# Patient Record
Sex: MALE | Race: WHITE | NOT HISPANIC OR LATINO | Employment: OTHER | URBAN - METROPOLITAN AREA
[De-identification: names, ages, dates, MRNs, and addresses within clinical notes are randomized per-mention and may not be internally consistent; named-entity substitution may affect disease eponyms.]

---

## 2024-02-13 ENCOUNTER — OFFICE VISIT (OUTPATIENT)
Dept: VASCULAR SURGERY | Facility: CLINIC | Age: 69
End: 2024-02-13
Payer: MEDICARE

## 2024-02-13 VITALS
HEIGHT: 70 IN | OXYGEN SATURATION: 95 % | BODY MASS INDEX: 28.35 KG/M2 | HEART RATE: 73 BPM | WEIGHT: 198 LBS | DIASTOLIC BLOOD PRESSURE: 74 MMHG | SYSTOLIC BLOOD PRESSURE: 118 MMHG

## 2024-02-13 DIAGNOSIS — M25.512 SHOULDER PAIN, LEFT: Primary | ICD-10-CM

## 2024-02-13 PROCEDURE — 1159F MED LIST DOCD IN RCRD: CPT | Performed by: SURGERY

## 2024-02-13 PROCEDURE — 99203 OFFICE O/P NEW LOW 30 MIN: CPT | Performed by: SURGERY

## 2024-02-13 PROCEDURE — 1160F RVW MEDS BY RX/DR IN RCRD: CPT | Performed by: SURGERY

## 2024-02-13 RX ORDER — AMOXICILLIN 500 MG
1000 CAPSULE ORAL
COMMUNITY
Start: 2016-06-07

## 2024-02-13 RX ORDER — OLMESARTAN MEDOXOMIL 20 MG/1
TABLET ORAL
COMMUNITY
Start: 2019-06-05

## 2024-02-13 RX ORDER — ROSUVASTATIN CALCIUM 10 MG/1
TABLET, COATED ORAL
COMMUNITY

## 2024-02-19 PROBLEM — M25.512 SHOULDER PAIN, LEFT: Status: ACTIVE | Noted: 2024-02-19

## 2024-02-20 NOTE — PROGRESS NOTES
"Benjamin Hill is a 68 y.o. male     Subjective   This patient presents today as a new consult for evaluation of left arm pain.  He states that this pain has been going on now for about a month.  The pain is from his shoulder to his elbow.  He has never smoked.  He has no significant medical history.  He did have neck surgery.  He has no known drug allergies.  He is 5 foot 10 and weighs 198 pounds.  He is currently not a diabetic.         Objective     Vitals:    02/13/24 1300   BP: 118/74   Pulse: 73   SpO2: 95%      Physical Exam  This patient is alert and oriented x3.  No acute distress.  Head is normocephalic.  Pupils are equal and reactive to light and accommodation.  Neck is soft and supple without palpable lymph nodes.  Heart is regular rate.  Lungs are clear.  Abdomen is soft with positive bowel sounds there is no pain on palpation.  Upper and lower extremities have adequate range of motion with palpable brachial radial femoral and popliteal pulses.  Skin turgor is adequate.  Psychologically the patient appears to be acting appropriately.  Blood pressure 118/74, pulse 73, height 1.778 m (5' 10\"), weight 89.8 kg (198 lb), SpO2 95 %.            There are no problems to display for this patient.         Current Outpatient Medications:     olmesartan (BENIcar) 20 mg tablet, , Disp: , Rfl:     omega-3 fatty acids-fish oil 300-1,000 mg capsule, Take 1 capsule (1,000 mg) by mouth once daily., Disp: , Rfl:     rosuvastatin (Crestor) 10 mg tablet, , Disp: , Rfl:      No results found for: \"WBC\", \"HGB\", \"HCT\", \"PLT\", \"CHOL\", \"TRIG\", \"HDL\", \"LDLDIRECT\", \"ALT\", \"AST\", \"NA\", \"K\", \"CL\", \"CREATININE\", \"BUN\", \"CO2\", \"TSH\", \"PSA\", \"INR\", \"GLUF\", \"HGBA1C\", \"ALBUR\"    Patient was never admitted.              Assessment/Plan   Active Problems:  There are no active Hospital Problems.      This patient presents today as a new consult for evaluation of left upper arm pain.  He states the pain does go from his shoulder to his " elbow.  He does have a history of neck surgery.  On physical exam, he does have easily palpable brachial radial and ulnar pulses.  His left hand also appears well-perfused.  It is difficult for him to exactly describe the pain.  I am concerned about his previous neck surgery and his left upper arm pain.  At this time, I would like to refer him to Dr. Missy Mata who is a nonsurgical orthopedic physician.  His left upper arm pain may very well be referred pain from his C-spine or possibly shoulder arthritis.  Patient is in full agreement with this plan.  Thank you very much for allow me to take part in the care of your patient.  Sincerely years, Dr. Armin Jackson, DO

## 2025-05-28 ENCOUNTER — OFFICE VISIT (OUTPATIENT)
Dept: URGENT CARE | Age: 70
End: 2025-05-28
Payer: MEDICARE

## 2025-05-28 ENCOUNTER — ANCILLARY PROCEDURE (OUTPATIENT)
Dept: URGENT CARE | Age: 70
End: 2025-05-28
Payer: MEDICARE

## 2025-05-28 VITALS
OXYGEN SATURATION: 98 % | SYSTOLIC BLOOD PRESSURE: 129 MMHG | DIASTOLIC BLOOD PRESSURE: 75 MMHG | HEIGHT: 70 IN | RESPIRATION RATE: 18 BRPM | TEMPERATURE: 98 F | HEART RATE: 68 BPM | BODY MASS INDEX: 27.2 KG/M2 | WEIGHT: 190 LBS

## 2025-05-28 DIAGNOSIS — R10.32 LEFT LOWER QUADRANT ABDOMINAL PAIN: Primary | ICD-10-CM

## 2025-05-28 DIAGNOSIS — R10.32 LEFT LOWER QUADRANT ABDOMINAL PAIN: ICD-10-CM

## 2025-05-28 LAB
POC APPEARANCE, URINE: CLEAR
POC BILIRUBIN, URINE: NEGATIVE
POC BLOOD, URINE: ABNORMAL
POC COLOR, URINE: YELLOW
POC GLUCOSE, URINE: NEGATIVE MG/DL
POC KETONES, URINE: ABNORMAL MG/DL
POC LEUKOCYTES, URINE: NEGATIVE
POC NITRITE,URINE: NEGATIVE
POC PH, URINE: 6 PH
POC PROTEIN, URINE: ABNORMAL MG/DL
POC SPECIFIC GRAVITY, URINE: 1.02
POC UROBILINOGEN, URINE: 0.2 EU/DL

## 2025-05-28 PROCEDURE — 74022 RADEX COMPL AQT ABD SERIES: CPT | Performed by: FAMILY MEDICINE

## 2025-05-28 RX ORDER — DICYCLOMINE HYDROCHLORIDE 20 MG/1
20 TABLET ORAL 3 TIMES DAILY
Qty: 30 TABLET | Refills: 0 | Status: SHIPPED | OUTPATIENT
Start: 2025-05-28 | End: 2025-06-02

## 2025-05-28 RX ORDER — APIXABAN 5 MG/1
TABLET, FILM COATED ORAL
COMMUNITY
Start: 2024-09-23

## 2025-05-28 RX ORDER — METOPROLOL SUCCINATE 50 MG/1
TABLET, EXTENDED RELEASE ORAL
COMMUNITY
Start: 2025-04-04

## 2025-05-28 RX ORDER — AMIODARONE HYDROCHLORIDE 200 MG/1
100 TABLET ORAL
COMMUNITY
Start: 2024-10-03

## 2025-05-28 RX ORDER — AMOXICILLIN AND CLAVULANATE POTASSIUM 875; 125 MG/1; MG/1
875 TABLET, FILM COATED ORAL 2 TIMES DAILY
Qty: 20 TABLET | Refills: 0 | Status: SHIPPED | OUTPATIENT
Start: 2025-05-28 | End: 2025-06-04

## 2025-05-28 NOTE — PATIENT INSTRUCTIONS
You had x-rays taken. Your x-ray reading is an initial interpretation. It will be further reviewed by a radiologist. If further treatment is necessary, you will be notified by the urgent care.    Hydrate well with plenty of fluids.    You will be started on antibiotic which will be sent to the pharmacy; please complete the regimen as directed even if your symptoms improve. It is recommended to take an Probiotic while on this medication to reduce symptoms of upset stomach, diarrhea.    Use the prescribed medication for your abdomen as directed, as needed for pain and cramping.    Follow-up with your PCP if symptoms are not improving in the next 3-5 days, or sooner if they worsen.

## 2025-05-28 NOTE — PROGRESS NOTES
"Subjective   Patient ID: Benjamin Hill is a 69 y.o. male. They present today with a chief complaint of Abdominal Pain (L sided stomach pain. ).    Patient disposition: Home    History of Present Illness  HPI  1 day of left-sided abdominal pain.  Pain is localized to the left lower abdomen.  Area is about 3 x 3 cm².  Started yesterday afternoon and since then has been consistent.  Was at the baseball game and had to leave early due to the pain.  Has not wanted to eat much today, has been eating crackers.  Has been drinking well.  No fever or chills.  No nausea or vomiting.  No diarrhea.  No blood in stool.  History of prostate seed implants 7 years ago.  Numbers have been normal since.  Abdominal surgery includes ventral hernia repair several years ago.  No medications taken for the symptoms.  History of A-fib, watch tells him his rate is usually 70-80.  Had 1 episode where bumped up to 110.  Taking a deep breath causes left lower chest/upper abdominal pain.  No history of diverticulitis.  Colonoscopy x 3 was clear.      Past Medical History  Allergies as of 05/28/2025    (No Known Allergies)       Prescriptions Prior to Admission[1]     Current Medications[2]    Problem List[3]    Surgical History[4]     reports that he has never smoked. He has never used smokeless tobacco. He reports that he does not drink alcohol and does not use drugs.    Review of Systems  As noted in HPI. ROS otherwise negative unless noted.       Objective    Vitals:    05/28/25 1728   BP: 129/75   Pulse: 68   Resp: 18   Temp: 36.7 °C (98 °F)   TempSrc: Oral   SpO2: 98%   Weight: 86.2 kg (190 lb)   Height: 1.778 m (5' 10\")     No LMP for male patient.    Physical Exam  Constitutional: vital signs reviewed. Well developed, well nourished. patient alert and patient without distress.   Head and Face: Normal and atraumatic.      Cardiovascular: Heart rate normal, normal S1 and S2, no gallops, no murmurs and no pericardial rub. Rhythm: " Normal.  Pulmonary: No respiratory distress. Palpation of chest: Normal. Clear bilateral breath sounds.   Abdomen: Soft ; no abdominal mass palpated. No organomegaly.  Negative flank tenderness on percussion.  Normal bowel sounds.  Localized left lower quadrant tenderness small area.  Negative rebound.  No overlying skin changes.  Normal movement and rotation of lumbar spine.  Skin: Normal skin color and pigmentation, normal skin turgor, and no rash.        Procedures    Point of Care Test & Imaging Results from this visit           Diagnostic study results (if any) were reviewed.  EKG normal sinus rhythm at 65, not currently in A-fib.  Urinalysis reviewed.  (If applicable) preliminary radiology reading: Abdomen x-ray normal.  Chest x-ray normal.    Assessment/Plan   Allergies, medications, history, and pertinent labs/EKGs/Imaging reviewed.        Medical Decision Making  See note    Orders and Diagnoses  There are no diagnoses linked to this encounter.    Medical Admin Record      Follow Up Instructions  No follow-ups on file.    At time of discharge patient was clinically well-appearing and HDS for outpatient management. The patient and/or family was educated regarding diagnosis, supportive care, OTC and Rx medications. The patient and/or family was given the opportunity to ask questions prior to discharge and all questions answered. They verbalized understanding of my discussion of the plans for treatment, expected course, indications to return to  or seek further evaluation in ED, and the need for timely follow up as directed.      Electronically signed by Alexandr Jones MD           [1] (Not in a hospital admission)  [2]   Current Outpatient Medications   Medication Sig Dispense Refill    amiodarone (Pacerone) 200 mg tablet Take 100 mg by mouth.      Eliquis 5 mg tablet       metoprolol succinate XL (Toprol-XL) 50 mg 24 hr tablet       olmesartan (BENIcar) 20 mg tablet       omega-3 fatty acids-fish oil  300-1,000 mg capsule Take 1 capsule (1,000 mg) by mouth once daily.      rosuvastatin (Crestor) 10 mg tablet        No current facility-administered medications for this visit.   [3]   Patient Active Problem List  Diagnosis    Shoulder pain, left   [4] History reviewed. No pertinent surgical history.

## 2025-05-28 NOTE — PROGRESS NOTES
"Subjective   Patient ID: Benjamin Hill is a 69 y.o. male. They present today with a chief complaint of Abdominal Pain (L sided stomach pain. ).    History of Present Illness  HPI  Patient presents with 1 day of left-sided abdominal pain.  Past Medical History  Allergies as of 05/28/2025    (No Known Allergies)       Prescriptions Prior to Admission[1]     Medical History[2]    Surgical History[3]     reports that he has never smoked. He has never used smokeless tobacco. He reports that he does not drink alcohol and does not use drugs.    Review of Systems  Review of Systems                               Objective    Vitals:    05/28/25 1728   BP: 129/75   Pulse: 68   Resp: 18   Temp: 36.7 °C (98 °F)   TempSrc: Oral   SpO2: 98%   Weight: 86.2 kg (190 lb)   Height: 1.778 m (5' 10\")     No LMP for male patient.    Physical Exam    Procedures    Point of Care Test & Imaging Results from this visit  No results found for this visit on 05/28/25.   Imaging  No results found.    Cardiology, Vascular, and Other Imaging  No other imaging results found for the past 2 days      Diagnostic study results (if any) were reviewed by Alexandr Jones MD.    Assessment/Plan   Allergies, medications, history, and pertinent labs/EKGs/Imaging reviewed by lAexandr Jones MD.     Medical Decision Making  ***    Orders and Diagnoses  There are no diagnoses linked to this encounter.    Medical Admin Record      Patient disposition: { Disposition:[id number]}    Electronically signed by Alexandr Jones MD  5:34 PM           [1] (Not in a hospital admission)  [2] No past medical history on file.  [3] No past surgical history on file.    "

## 2025-08-26 ENCOUNTER — OFFICE VISIT (OUTPATIENT)
Age: 70
End: 2025-08-26
Payer: MEDICARE

## 2025-08-26 ENCOUNTER — HOSPITAL ENCOUNTER (OUTPATIENT)
Dept: ORTHOPEDIC SURGERY | Age: 70
Discharge: HOME | End: 2025-08-28
Payer: MEDICARE

## 2025-08-26 VITALS
OXYGEN SATURATION: 98 % | HEART RATE: 58 BPM | WEIGHT: 196 LBS | BODY MASS INDEX: 28.06 KG/M2 | HEIGHT: 70 IN | SYSTOLIC BLOOD PRESSURE: 110 MMHG | DIASTOLIC BLOOD PRESSURE: 70 MMHG | TEMPERATURE: 97.8 F

## 2025-08-26 DIAGNOSIS — M54.32 SCIATICA OF LEFT SIDE: ICD-10-CM

## 2025-08-26 DIAGNOSIS — M17.12 PRIMARY OSTEOARTHRITIS OF LEFT KNEE: Primary | ICD-10-CM

## 2025-08-26 DIAGNOSIS — M75.41 IMPINGEMENT SYNDROME OF RIGHT SHOULDER: ICD-10-CM

## 2025-08-26 DIAGNOSIS — M19.011 PRIMARY OSTEOARTHRITIS OF RIGHT SHOULDER: ICD-10-CM

## 2025-08-26 DIAGNOSIS — M25.562 ACUTE PAIN OF LEFT KNEE: ICD-10-CM

## 2025-08-26 DIAGNOSIS — R52 PAIN: ICD-10-CM

## 2025-08-26 PROCEDURE — 1159F MED LIST DOCD IN RCRD: CPT | Performed by: STUDENT IN AN ORGANIZED HEALTH CARE EDUCATION/TRAINING PROGRAM

## 2025-08-26 PROCEDURE — 4004F PT TOBACCO SCREEN RCVD TLK: CPT | Performed by: STUDENT IN AN ORGANIZED HEALTH CARE EDUCATION/TRAINING PROGRAM

## 2025-08-26 PROCEDURE — G8419 CALC BMI OUT NRM PARAM NOF/U: HCPCS | Performed by: STUDENT IN AN ORGANIZED HEALTH CARE EDUCATION/TRAINING PROGRAM

## 2025-08-26 PROCEDURE — 1125F AMNT PAIN NOTED PAIN PRSNT: CPT | Performed by: STUDENT IN AN ORGANIZED HEALTH CARE EDUCATION/TRAINING PROGRAM

## 2025-08-26 PROCEDURE — 1123F ACP DISCUSS/DSCN MKR DOCD: CPT | Performed by: STUDENT IN AN ORGANIZED HEALTH CARE EDUCATION/TRAINING PROGRAM

## 2025-08-26 PROCEDURE — 73030 X-RAY EXAM OF SHOULDER: CPT

## 2025-08-26 PROCEDURE — 3017F COLORECTAL CA SCREEN DOC REV: CPT | Performed by: STUDENT IN AN ORGANIZED HEALTH CARE EDUCATION/TRAINING PROGRAM

## 2025-08-26 PROCEDURE — 1160F RVW MEDS BY RX/DR IN RCRD: CPT | Performed by: STUDENT IN AN ORGANIZED HEALTH CARE EDUCATION/TRAINING PROGRAM

## 2025-08-26 PROCEDURE — 99214 OFFICE O/P EST MOD 30 MIN: CPT | Performed by: STUDENT IN AN ORGANIZED HEALTH CARE EDUCATION/TRAINING PROGRAM

## 2025-08-26 PROCEDURE — 73564 X-RAY EXAM KNEE 4 OR MORE: CPT

## 2025-08-26 PROCEDURE — 99215 OFFICE O/P EST HI 40 MIN: CPT | Performed by: STUDENT IN AN ORGANIZED HEALTH CARE EDUCATION/TRAINING PROGRAM

## 2025-08-26 PROCEDURE — G8427 DOCREV CUR MEDS BY ELIG CLIN: HCPCS | Performed by: STUDENT IN AN ORGANIZED HEALTH CARE EDUCATION/TRAINING PROGRAM

## 2025-08-26 RX ORDER — METOPROLOL SUCCINATE 50 MG/1
50 TABLET, EXTENDED RELEASE ORAL DAILY
COMMUNITY

## 2025-08-26 RX ORDER — OLMESARTAN MEDOXOMIL 20 MG/1
20 TABLET ORAL DAILY
COMMUNITY